# Patient Record
(demographics unavailable — no encounter records)

---

## 2024-12-10 NOTE — HISTORY OF PRESENT ILLNESS
[FreeTextEntry1] : Dec 09, 2024   in person, first visit  12/9/24 A1c 6.6%    PCP:  Dr. Maggie Mcgarry c/o [facility]   F   CC:  Diabetes x 5 years   mg/dl   62 yo  found to have diabetes when she was having unexpected weight loss. Has been taking Levemir and recently switched to Semglee 30 units in PM and also took Trulicity (not currently) Jardiance 10 mg daily   (previously JanuMet)   Reports FBS around 100 mg/dl    : : Constitutional:  Alert, well nourished, healthy appearance, no acute distress  Eyes:  No proptosis, no stare Thyroid: Pulmonary:  No respiratory distress, no accessory muscle use; normal rate and effort Cardiac:  Normal rate Vascular:  Endocrine:  No stigmata of Cushings Syndrome Musculoskeletal:  Normal gait, no involuntary movements Neurology:  No tremors Affect/Mood/Psych:  Oriented x 3; normal affect, normal insight/judgement, normal mood  . Impression: Based on A1c of 6.6% and her fingerstick BS, she is doing an excellent job.  Plan:  Same Rx and bring meter to next visit.    Annual eye exam - to see in 3 weeks.   May wish to also have Podiatry evaluation.     Continue  Trulicity and JanuMet    Thank you.

## 2024-12-10 NOTE — HISTORY OF PRESENT ILLNESS
[FreeTextEntry1] : Dec 09, 2024   in person, first visit  12/9/24 A1c 6.6%    PCP:  Dr. Maggie Mcgarry c/o [facility]   F   CC:  Diabetes x 5 years   mg/dl   60 yo  found to have diabetes when she was having unexpected weight loss. Has been taking Levemir and recently switched to Semglee 30 units in PM and also took Trulicity (not currently) Jardiance 10 mg daily   (previously JanuMet)   Reports FBS around 100 mg/dl    : : Constitutional:  Alert, well nourished, healthy appearance, no acute distress  Eyes:  No proptosis, no stare Thyroid: Pulmonary:  No respiratory distress, no accessory muscle use; normal rate and effort Cardiac:  Normal rate Vascular:  Endocrine:  No stigmata of Cushings Syndrome Musculoskeletal:  Normal gait, no involuntary movements Neurology:  No tremors Affect/Mood/Psych:  Oriented x 3; normal affect, normal insight/judgement, normal mood  . Impression: Based on A1c of 6.6% and her fingerstick BS, she is doing an excellent job.  Plan:  Same Rx and bring meter to next visit.    Annual eye exam - to see in 3 weeks.   May wish to also have Podiatry evaluation.     Continue  Trulicity and JanuMet    Thank you.